# Patient Record
Sex: FEMALE | Race: WHITE | NOT HISPANIC OR LATINO | URBAN - METROPOLITAN AREA
[De-identification: names, ages, dates, MRNs, and addresses within clinical notes are randomized per-mention and may not be internally consistent; named-entity substitution may affect disease eponyms.]

---

## 2017-07-27 ENCOUNTER — APPOINTMENT (RX ONLY)
Dept: URBAN - METROPOLITAN AREA CLINIC 50 | Facility: CLINIC | Age: 77
Setting detail: DERMATOLOGY
End: 2017-07-27

## 2017-07-27 ENCOUNTER — RX ONLY (OUTPATIENT)
Age: 77
Setting detail: RX ONLY
End: 2017-07-27

## 2017-07-27 DIAGNOSIS — L98.8 OTHER SPECIFIED DISORDERS OF THE SKIN AND SUBCUTANEOUS TISSUE: ICD-10-CM

## 2017-07-27 PROBLEM — Z85.828 PERSONAL HISTORY OF OTHER MALIGNANT NEOPLASM OF SKIN: Status: ACTIVE | Noted: 2017-07-27

## 2017-07-27 PROBLEM — L85.3 XEROSIS CUTIS: Status: ACTIVE | Noted: 2017-07-27

## 2017-07-27 PROBLEM — L90.8 OTHER ATROPHIC DISORDERS OF SKIN: Status: ACTIVE | Noted: 2017-07-27

## 2017-07-27 PROBLEM — J30.1 ALLERGIC RHINITIS DUE TO POLLEN: Status: ACTIVE | Noted: 2017-07-27

## 2017-07-27 PROBLEM — F32.9 MAJOR DEPRESSIVE DISORDER, SINGLE EPISODE, UNSPECIFIED: Status: ACTIVE | Noted: 2017-07-27

## 2017-07-27 PROCEDURE — ? MEDICAL CONSULTATION: FILLERS

## 2017-07-27 RX ORDER — TRETINOIN 0.5 MG/G
CREAM TOPICAL QHS
Qty: 1 | Refills: 0 | COMMUNITY
Start: 2017-07-27

## 2017-07-27 ASSESSMENT — LOCATION DETAILED DESCRIPTION DERM
LOCATION DETAILED: RIGHT INFERIOR MEDIAL MALAR CHEEK
LOCATION DETAILED: LEFT UPPER CUTANEOUS LIP
LOCATION DETAILED: LEFT SUPERIOR CENTRAL MALAR CHEEK
LOCATION DETAILED: RIGHT SUPERIOR LATERAL MALAR CHEEK

## 2017-07-27 ASSESSMENT — LOCATION SIMPLE DESCRIPTION DERM
LOCATION SIMPLE: RIGHT CHEEK
LOCATION SIMPLE: LEFT LIP
LOCATION SIMPLE: LEFT CHEEK

## 2017-07-27 ASSESSMENT — LOCATION ZONE DERM
LOCATION ZONE: LIP
LOCATION ZONE: FACE

## 2017-09-18 ENCOUNTER — NEW PATIENT COMPREHENSIVE (OUTPATIENT)
Dept: URBAN - METROPOLITAN AREA CLINIC 43 | Facility: CLINIC | Age: 77
End: 2017-09-18

## 2017-09-18 DIAGNOSIS — H43.812: ICD-10-CM

## 2017-09-18 DIAGNOSIS — H04.123: ICD-10-CM

## 2017-09-18 DIAGNOSIS — Z96.1: ICD-10-CM

## 2017-09-18 PROCEDURE — 92004 COMPRE OPH EXAM NEW PT 1/>: CPT

## 2017-09-18 PROCEDURE — 92015 DETERMINE REFRACTIVE STATE: CPT

## 2017-09-18 PROCEDURE — G8427 DOCREV CUR MEDS BY ELIG CLIN: HCPCS

## 2017-09-18 PROCEDURE — G8785 BP SCRN NO PERF AT INTERVAL: HCPCS

## 2017-09-18 ASSESSMENT — VISUAL ACUITY
OS_SC: 20/30-2
OD_SC: J12
OS_SC: J12
OD_SC: 20/25-2
OD_CC: J3
OS_CC: J3

## 2017-09-18 ASSESSMENT — TONOMETRY
OS_IOP_MMHG: 12
OD_IOP_MMHG: 12

## 2017-10-12 ENCOUNTER — APPOINTMENT (RX ONLY)
Dept: URBAN - METROPOLITAN AREA CLINIC 50 | Facility: CLINIC | Age: 77
Setting detail: DERMATOLOGY
End: 2017-10-12

## 2017-10-12 DIAGNOSIS — L98.8 OTHER SPECIFIED DISORDERS OF THE SKIN AND SUBCUTANEOUS TISSUE: ICD-10-CM

## 2017-10-12 PROBLEM — L90.8 OTHER ATROPHIC DISORDERS OF SKIN: Status: ACTIVE | Noted: 2017-10-12

## 2017-10-12 PROCEDURE — ? FILLERS (CC)

## 2017-10-12 NOTE — PROCEDURE: FILLERS (CC)
Detail Level: Detailed
Lot #: [id number]
Filler: Juvederm Voluma XC
Additional Area 1 Location: bilateral cheeks
Post-Care Instructions: Patient instructed to apply ice to reduce swelling.
Consent: Written consent obtained. Risks include but not limited to bruising, beading, irregular texture, ulceration, infection, allergic reaction, scar formation, incomplete augmentation, temporary nature, procedural pain.
Marionette Lines Filler  Volume In Cc: 0
Additional Area 1 Volume In Cc: 1
Topical Anesthesia?: 20% benzocaine, 8% lidocaine, 4% tetracaine
Expiration Date (Month Year): 01/2018

## 2018-04-26 ENCOUNTER — APPOINTMENT (RX ONLY)
Dept: URBAN - METROPOLITAN AREA CLINIC 19 | Facility: CLINIC | Age: 78
Setting detail: DERMATOLOGY
End: 2018-04-26

## 2018-04-26 DIAGNOSIS — L98.8 OTHER SPECIFIED DISORDERS OF THE SKIN AND SUBCUTANEOUS TISSUE: ICD-10-CM

## 2018-04-26 PROCEDURE — ? FILLERS (CC)

## 2018-04-26 NOTE — PROCEDURE: FILLERS (CC)
Nasolabial Folds Filler Volume In Cc: 0.6
Detail Level: Detailed
Lot #: [id number]
Post-Care Instructions: Patient instructed to apply ice to reduce swelling.
Additional Area 1 Location: nasal dorsum
Vermilion Lips Filler Volume In Cc: 0
Consent: Written consent obtained. Risks include but not limited to bruising, beading, irregular texture, ulceration, infection, allergic reaction, scar formation, incomplete augmentation, temporary nature, procedural pain.
Additional Area 2 Volume In Cc: 0.3
Anesthesia Type: 1% Xylocaine with epinephrine
Additional Anesthesia Volume In Cc: 6
Filler: Juvederm Ultra Plus XC
Anesthesia Volume In Cc: 0.5
Expiration Date (Month Year): 11/14/18
Additional Area 2 Location: tear troughs
Additional Area 1 Volume In Cc: 0.1

## 2018-06-06 ENCOUNTER — APPOINTMENT (RX ONLY)
Dept: URBAN - METROPOLITAN AREA CLINIC 19 | Facility: CLINIC | Age: 78
Setting detail: DERMATOLOGY
End: 2018-06-06

## 2018-06-06 DIAGNOSIS — L98.8 OTHER SPECIFIED DISORDERS OF THE SKIN AND SUBCUTANEOUS TISSUE: ICD-10-CM

## 2018-06-06 PROCEDURE — ? FILLERS (CC)

## 2018-06-06 ASSESSMENT — LOCATION DETAILED DESCRIPTION DERM
LOCATION DETAILED: LEFT CENTRAL BUCCAL CHEEK
LOCATION DETAILED: LEFT SUPERIOR VERMILION LIP
LOCATION DETAILED: RIGHT INFERIOR CENTRAL MALAR CHEEK
LOCATION DETAILED: RIGHT MEDIAL BUCCAL CHEEK
LOCATION DETAILED: LEFT INFERIOR MEDIAL MALAR CHEEK

## 2018-06-06 ASSESSMENT — LOCATION ZONE DERM
LOCATION ZONE: LIP
LOCATION ZONE: FACE

## 2018-06-06 ASSESSMENT — LOCATION SIMPLE DESCRIPTION DERM
LOCATION SIMPLE: RIGHT CHEEK
LOCATION SIMPLE: LEFT LIP
LOCATION SIMPLE: LEFT CHEEK

## 2018-06-06 NOTE — PROCEDURE: FILLERS (CC)
Additional Area 1 Location: cheeks
Additional Anesthesia Volume In Cc: 6
Filler: Juvederm Ultra Plus XC
Anesthesia Type: 1% Xylocaine with epinephrine
Additional Area 2 Location: perioral and oral commisure
Additional Area 1 Volume In Cc: 0.6
Detail Level: Detailed
Lot #: [id number]
Post-Care Instructions: Patient instructed to apply ice to reduce swelling.
Additional Area 2 Volume In Cc: 0.4
Expiration Date (Month Year): 3/9/19
Nasolabial Folds Filler Volume In Cc: 0.2
Consent: Written consent obtained. Risks include but not limited to bruising, beading, irregular texture, ulceration, infection, allergic reaction, scar formation, incomplete augmentation, temporary nature, procedural pain.
Anesthesia Volume In Cc: 0.5
Quantity Per Injection Site (Cc): 1.0 cc